# Patient Record
(demographics unavailable — no encounter records)

---

## 2024-10-25 NOTE — DISCUSSION/SUMMARY
[FreeTextEntry1] : ENT eval Discussed feeding issues and baby refusing to take bottles or pacifiers Discussed sleeping practices Recheck with 4 month WCC

## 2024-10-25 NOTE — PHYSICAL EXAM
[Discharge] : no discharge [Conjunctival Injection] : no conjunctival injection [Warm, Well Perfused x4] : warm, well perfused x4 [NL] : warm, clear [FreeTextEntry2] : AFOF [de-identified] : small tongue tie, able to thrust tongue forward

## 2024-10-25 NOTE — HISTORY OF PRESENT ILLNESS
[de-identified] : pt here to be checked for tounge tie  [FreeTextEntry6] : Saw lactation consultant, recommended seeing ENT for possible tongue tie issue Patient refuses to take pacifier or bottle, does not latch on to bottle Has no problems with breastfeeding, latch is good Patient gained 2 lbs in last month Lots of BMs and UO

## 2024-12-05 NOTE — HISTORY OF PRESENT ILLNESS
[Parents] : parents [Vitamins ___] : Patient takes [unfilled] vitamins daily [Normal] : Normal [Frequency of stools: ___] : Frequency of stools: [unfilled]  stools [In Bassinet/Crib] : sleeps in bassinet/crib [On back] : sleeps on back [Sleeps 12-16 hours per 24 hours (including naps)] : sleeps 12-16 hours per 24 hours (including naps) [No] : No cigarette smoke exposure [Water heater temperature set at <120 degrees F] : Water heater temperature set at <120 degrees F [Rear facing car seat in back seat] : Rear facing car seat in back seat [Carbon Monoxide Detectors] : Carbon monoxide detectors at home [Smoke Detectors] : Smoke detectors at home. [Co-sleeping] : no co-sleeping [Loose bedding, pillow, toys, and/or bumpers in crib] : no loose bedding, pillow, toys, and/or bumpers in crib [Pacifier use] : not using pacifier [Tummy time] : tummy time [Exposure to electronic nicotine delivery system] : No exposure to electronic nicotine delivery system [FreeTextEntry7] : 4m well visit [de-identified] : Breast feeding only, on demand, 20 minutes every 2 hours average  [FreeTextEntry1] : Got tongue tie clipped 1 month ago, doing well, now trying bottle w/ formula lactation consultant few times

## 2024-12-05 NOTE — PHYSICAL EXAM
[Alert] : alert [Normocephalic] : normocephalic [Flat Open Anterior [place]] : flat open anterior fontanelle [Red Reflex] : red reflex bilateral [PERRL] : PERRL [Normally Placed Ears] : normally placed ears [Auricles Well Formed] : auricles well formed [Clear Tympanic membranes] : clear tympanic membranes [Light reflex present] : light reflex present [Bony landmarks visible] : bony landmarks visible [Nares Patent] : nares patent [Palate Intact] : palate intact [Uvula Midline] : uvula midline [Symmetric Chest Rise] : symmetric chest rise [Clear to Auscultation Bilaterally] : clear to auscultation bilaterally [Regular Rate and Rhythm] : regular rate and rhythm [S1, S2 present] : S1, S2 present [+2 Femoral Pulses] : (+) 2 femoral pulses [Soft] : soft [Bowel Sounds] : bowel sounds present [Central Urethral Opening] : central urethral opening [Testicles Descended] : testicles descended bilaterally [Patent] : patent [Normally Placed] : normally placed [No Abnormal Lymph Nodes Palpated] : no abnormal lymph nodes palpated [Startle Reflex] : startle reflex present [Plantar Grasp] : plantar grasp reflex present [Symmetric Narda] : symmetric narda [Acute Distress] : no acute distress [Discharge] : no discharge [Palpable Masses] : no palpable masses [Murmurs] : no murmurs [Tender] : nontender [Distended] : nondistended [Hepatomegaly] : no hepatomegaly [Splenomegaly] : no splenomegaly [Briceño-Ortolani] : negative Briceño-Ortolani [Allis Sign] : negative Allis sign [Spinal Dimple] : no spinal dimple [Tuft of Hair] : no tuft of hair [Rash or Lesions] : no rash/lesions

## 2024-12-05 NOTE — PLAN
[TextEntry] : Recommend exclusive breastfeeding, 8-12 feedings per day. Mother should continue prenatal vitamins and avoid alcohol. If formula is needed, recommend iron-fortified formulations 6-8 feeds per day. When in car, patient should be in rear-facing car seat in back seat, do not leave baby in car for any reason or any time. Put baby to sleep on back, in own crib with no loose or soft bedding, no co-sleeping. Do not overheat baby. Protect baby by avoiding large crowds, extreme hot or cold environments, do not put baby in direct sun. Help baby to develop sleep and feeding routines. May offer pacifier if needed. Continue tummy time when awake and observed, to encourage play and muscle strength. Avoid screen/tv time at this age. Limit baby's exposure to others, especially those with fever or unknown vaccine status. Wash hands often, especially after changing diaper. Keep tobacco/vape free household and environment. Encourage family/caregivers to get protective vaccinations (flu, tdap). Parents counseled to call if rectal temperature >100.4 degrees F. Encourage parents to take CPR program/first aid program for infant. Start creating a daily routine for feeds and naps. Encourage both quiet and active playtime with baby for emerging social play development.   Iron fortified cereals mixed with formula or breast milk may be introduced using a spoon and bowl. Reinforced to only start solids/cereals if baby is able to hold head upright without support.  Monitor for tooth eruption, no bottles in bed, can start wiping erupting teeth with wash cloth or a soft toothbrush.   [ Vaccinations given at this visit can cause low grade temperature, irritability, and site reaction -- can use warm compress and Tylenol as needed ]   Return for 6 month well visit or sooner if needed or if concerns arise.

## 2024-12-21 NOTE — PHYSICAL EXAM
[Discharge] : no discharge [Conjunctival Injection] : no conjunctival injection [Erythema] : no erythema [Bulging] : not bulging [Wheezing] : no wheezing [Rales] : no rales [Crackles] : no crackles [Transmitted Upper Airway Sounds] : no transmitted upper airway sounds [Tachypnea] : no tachypnea [Rhonchi] : no rhonchi [Belly Breathing] : no belly breathing [Subcostal Retractions] : no subcostal retractions [Suprasternal Retractions] : no suprasternal retractions [Warm, Well Perfused x4] : warm, well perfused x4 [NL] : warm, clear [FreeTextEntry2] : AFOF [de-identified] : pink, no cyanosis

## 2024-12-21 NOTE — DISCUSSION/SUMMARY
[FreeTextEntry1] : Discussed sleep training and habits Maintain adequate hydration  Stressed handwashing and infection control  Pay close observation for new or worsening symptoms Recheck as needed

## 2024-12-21 NOTE — REVIEW OF SYSTEMS
[Fussy] : not fussy [Fever] : no fever [Ear Tugging] : no ear tugging [Nasal Discharge] : no nasal discharge [Nasal Congestion] : no nasal congestion [Cyanosis] : no cyanosis [Tachypnea] : not tachypneic [Wheezing] : no wheezing [Cough] : cough [Vomiting] : no vomiting [Diarrhea] : no diarrhea [Negative] : Genitourinary

## 2024-12-21 NOTE — HISTORY OF PRESENT ILLNESS
[de-identified] : recheck lungs [FreeTextEntry6] : Cough much better, using saline nebulizer treatments with improvement Appetite good, no vomiting or diarrhea No more fever Mom has questions about sleep training

## 2025-02-13 NOTE — PLAN
[TextEntry] : Recommend exclusive breastfeeding, 8-12 feedings per day, continue Vitamin D supplement. Mother should continue prenatal vitamins and avoid alcohol. If formula is needed, recommend iron-fortified formulations 5-6 feeds per day. When in car, patient should be in rear-facing car seat in back seat, do not leave baby in car for any reason or any time. Put baby to sleep on back, in own crib with no loose or soft bedding, no co-sleeping. Can use sunscreen if outside, do not put baby in direct sunlight. Help baby to develop consistent sleep and feeding routines. May offer pacifier if needed. Continue tummy time when awake and observed, to encourage play and muscle strength. Encourage sitting up. Avoid screen/tv time at this age. Wash hands often, especially after changing diaper. Keep tobacco/vape free household and environment. Encourage family/caregivers to get protective vaccinations. Talk with your baby, play music and sing, can play peekaboo with baby. Encourage face to face interactions and communication. Encourage self-calming behaviors. Maintain safe home due to increasingly mobile baby. Do not leave baby alone.   Monitor for tooth eruption, no propping bottles, and no bottles in bed, can start wiping erupting teeth with wash cloth or a soft toothbrush. Introduce single-ingredient foods rich in iron varying in texture, one at a time, several apart to watch for any allergic reaction. Do not give baby honey. Avoid sweetened drinks/juices. Avoid choking by limiting finger foods to smaller than a cheerio. No hard/compressible foods. Start daily multivitamin with fluoride.   [ Vaccinations given at this visit can cause low grade temperature, irritability, and site reaction -- can use warm compress and Tylenol / Ibuprofen as needed ]   Return for 9 month well visit or sooner if needed or if concerns arise.

## 2025-02-13 NOTE — DISCUSSION/SUMMARY
[Normal Growth] : growth [Normal Development] : development [None] : No medical problems [No Elimination Concerns] : elimination [No Feeding Concerns] : feeding [No Skin Concerns] : skin [Normal Sleep Pattern] : sleep [Family Functioning] : family functioning [Nutrition and Feeding] : nutrition and feeding [Infant Development] : infant development [Oral Health] : oral health [Safety] : safety [No Medications] : ~He/She~ is not on any medications [Parent/Guardian] : parent/guardian [] : The components of the vaccine(s) to be administered today are listed in the plan of care. The disease(s) for which the vaccine(s) are intended to prevent and the risks have been discussed with the caretaker.  The risks are also included in the appropriate vaccination information statements which have been provided to the patient's caregiver.  The caregiver has given consent to vaccinate. [FreeTextEntry1] : Defer flu/covid

## 2025-02-13 NOTE — HISTORY OF PRESENT ILLNESS
[Mother] : mother [Egg] : no egg [Normal] : Normal [In Bassinet/Crib] : sleeps in bassinet/crib [On back] : sleeps on back [Co-sleeping] : no co-sleeping [Sleeps 12-16 hours per 24 hours (including naps)] : sleeps 12-16 hours per 24 hours (including naps) [Loose bedding, pillow, toys, and/or bumpers in crib] : no loose bedding, pillow, toys, and/or bumpers in crib [Tummy time] : tummy time [No] : No cigarette smoke exposure [Exposure to electronic nicotine delivery system] : No exposure to electronic nicotine delivery system [Water heater temperature set at <120 degrees F] : Water heater temperature set at <120 degrees F [Rear facing car seat in back seat] : Rear facing car seat in back seat [Carbon Monoxide Detectors] : Carbon monoxide detectors at home [Smoke Detectors] : Smoke detectors at home. [de-identified] : 6 month well visit [de-identified] : Formula (Sim isopure) 7oz every 3 hours during day, taking purees (veggies/fruits/cereals), has had yogurt too  [FreeTextEntry1] : Allergist doing BW next week  Concerned about peanuts d/t 2 hours after giving it that he has slight rash on belly -- has f/u with allergist, avoiding peanuts   NO other specialists

## 2025-02-13 NOTE — PHYSICAL EXAM
[Alert] : alert [Consolable] : consolable [Normocephalic] : normocephalic [Flat Open Anterior [place]] : flat open anterior fontanelle [Red Reflex] : red reflex bilateral [PERRL] : PERRL [Normally Placed Ears] : normally placed ears [Auricles Well Formed] : auricles well formed [Clear Tympanic membranes] : clear tympanic membranes [Light reflex present] : light reflex present [Bony landmarks visible] : bony landmarks visible [Nares Patent] : nares patent [Palate Intact] : palate intact [Uvula Midline] : uvula midline [Supple, full passive range of motion] : supple, full passive range of motion [Symmetric Chest Rise] : symmetric chest rise [Clear to Auscultation Bilaterally] : clear to auscultation bilaterally [Regular Rate and Rhythm] : regular rate and rhythm [S1, S2 present] : S1, S2 present [+2 Femoral Pulses] : (+) 2 femoral pulses [Soft] : soft [Bowel Sounds] : bowel sounds present [Central Urethral Opening] : central urethral opening [Testicles Descended] : testicles descended bilaterally [Patent] : patent [Normally Placed] : normally placed [No Abnormal Lymph Nodes Palpated] : no abnormal lymph nodes palpated [Symmetric Buttocks Creases] : symmetric buttocks creases [Plantar Grasp] : plantar grasp reflex present [Cranial Nerves Grossly Intact] : cranial nerves grossly intact [Acute Distress] : no acute distress [Discharge] : no discharge [Tooth Eruption] : no tooth eruption [Palpable Masses] : no palpable masses [Murmurs] : no murmurs [Tender] : nontender [Distended] : nondistended [Hepatomegaly] : no hepatomegaly [Splenomegaly] : no splenomegaly [Briceño-Ortolani] : negative Briceño-Ortolani [Allis Sign] : negative Allis sign [Spinal Dimple] : no spinal dimple [Tuft of Hair] : no tuft of hair [Rash or Lesions] : no rash/lesions

## 2025-02-13 NOTE — DEVELOPMENTAL MILESTONES
[Normal Development] : Normal Development [None] : none [Pats or smiles at reflection] : pats or smiles at reflection [Begins to turn when name called] : begins to turn when name called [Babbles] : babbles [Rolls over prone to supine] : rolls over prone to supine [Sits briefly without support] : sits briefly without support [Rakes small object with 4 fingers] : rakes small object with 4 fingers [[place] small object on surface] : bangs small object on surface [Passed] : passed [FreeTextEntry1] : [place] reviewed

## 2025-06-23 NOTE — PHYSICAL EXAM
[Alert] : alert [Acute Distress] : no acute distress [Normocephalic] : normocephalic [Flat Open Anterior [place]] : flat open anterior fontanelle [Red Reflex] : red reflex bilateral [Excessive Tearing] : no excessive tearing [PERRL] : PERRL [Normally Placed Ears] : normally placed ears [Auricles Well Formed] : auricles well formed [Clear Tympanic membranes] : clear tympanic membranes [Light reflex present] : light reflex present [Bony landmarks visible] : bony landmarks visible [Discharge] : no discharge [Nares Patent] : nares patent [Palate Intact] : palate intact [Uvula Midline] : uvula midline [Supple, full passive range of motion] : supple, full passive range of motion [Palpable Masses] : no palpable masses [Symmetric Chest Rise] : symmetric chest rise [Clear to Auscultation Bilaterally] : clear to auscultation bilaterally [Regular Rate and Rhythm] : regular rate and rhythm [S1, S2 present] : S1, S2 present [Murmurs] : no murmurs [+2 Femoral Pulses] : (+) 2 femoral pulses [Soft] : soft [Tender] : nontender [Distended] : nondistended [Bowel Sounds] : bowel sounds present [Hepatomegaly] : no hepatomegaly [Splenomegaly] : no splenomegaly [Normal External Genitalia] : normal external genitalia [Circumcised] : circumcised [Central Urethral Opening] : central urethral opening [Testicles Descended] : testicles descended bilaterally [No Abnormal Lymph Nodes Palpated] : no abnormal lymph nodes palpated [Symmetric Abduction and Rotation of hips] : symmetric abduction and rotation of hips [Allis Sign] : negative Allis sign [Straight] : straight [Cranial Nerves Grossly Intact] : cranial nerves grossly intact [Rash or Lesions] : no rash/lesions [de-identified] : penile adhesions

## 2025-06-23 NOTE — DISCUSSION/SUMMARY
[Normal Growth] : growth [Normal Development] : development [None] : No known medical problems [No Elimination Concerns] : elimination [No Feeding Concerns] : feeding [No Skin Concerns] : skin [Normal Sleep Pattern] : sleep [Family Adaptation] : family adaptation [Infant [place]] : infant independence [Feeding Routine] : feeding routine [Safety] : safety [No Medications] : ~He/She~ is not on any medications [Parent/Guardian] : parent/guardian [FreeTextEntry1] : FAMILY ADAPTATIONS: Discussed discipline (parenting expectations, consistency, behavior management), cultural beliefs about child-rearing, family functioning, domestic violence.  INFANT DEVELOPMENT: Discussed changing sleep pattern (sleep schedule), developmental mobility (safe exploration, play), cognitive development (object permanence, separation anxiety, behavior and learning, temperament vs. self-regulation, visual exploration, cause and effect), communication.  NUTRITIONAL ADEQUACY AND GROWTH: Discussed self-feeding, mealtime routines, transition to solids (table food introduction), cup drinking, plans for weaning.  SAFETY: car safety seats, burns (hot stoves, heaters), window guards, drowning, poisoning, (safety locks), guns. Smoke and carbon monoxide monitors stressed.  Lead exposure discussed. Penile adhesions lysed with mechanical retraction. Patient tolerated procedure well. Recommend daily retraction q diaper change Bacitracin ointment q diaper change until healed Stressed handwashing and infection control  Pay close observation for new or worsening symptoms Recheck 1 year [facility]

## 2025-06-23 NOTE — HISTORY OF PRESENT ILLNESS
[Parents] : parents [Well-balanced] : well-balanced [Fruit] : fruit [Vegetables] : vegetables [Cereal] : cereal [Baby food] : baby food [Normal] : Normal [In Crib] : sleeps in crib [On back] : sleeps on back [Co-sleeping] : no co-sleeping [Wakes up at night] : wakes up at night [Sleeps 12-16 hours per 24 hours (including naps)] : Does not sleep 12-16 hours per 24 hours (including naps) [Pacifier use] : Pacifier use [Vitamin] : Primary Fluoride Source: Vitamin [No] : No cigarette smoke exposure [Water heater temperature set at <120 degrees F] : Water heater temperature set at <120 degrees F [Rear facing car seat in  back seat] : Rear facing car seat in  back seat [Carbon Monoxide Detectors] : Carbon monoxide detectors [Smoke Detectors] : Smoke detectors [Up to date] : Up to date [FreeTextEntry7] : 9 month well [NO] : No [FreeTextEntry1] : Had allergy testing, NEGATIVE peanut and egg allergy per mom

## 2025-06-23 NOTE — DEVELOPMENTAL MILESTONES
[Normal Development] : Normal Development [None] : none [Uses basic gestures] : uses basic gestures [Says "Og" or "Mama"] : says "Og" or "Mama" nonspecifically [Sits well without support] : sits well without support [Transitions between sitting and lying] : transitions between sitting and lying [Balances on hands and knees] : balances on hands and knees [Crawls] : crawls [Picks up small objects with 3 fingers] : picks up small objects with 3 fingers and thumb [Releases objects intentionally] : releases objects intentionally [[place] objects together] : bangs objects together [Yes] : Completed.

## 2025-07-30 NOTE — PHYSICAL EXAM
[Alert] : alert [Normocephalic] : normocephalic [Closed Anterior [place]] : closed anterior fontanelle [Red Reflex] : red reflex bilateral [PERRL] : PERRL [Normally Placed Ears] : normally placed ears [Auricles Well Formed] : auricles well formed [Clear Tympanic membranes] : clear tympanic membranes [Light reflex present] : light reflex present [Bony landmarks visible] : bony landmarks visible [Nares Patent] : nares patent [Palate Intact] : palate intact [Uvula Midline] : uvula midline [Tooth Eruption] : tooth eruption [Supple, full passive range of motion] : supple, full passive range of motion [Symmetric Chest Rise] : symmetric chest rise [Clear to Auscultation Bilaterally] : clear to auscultation bilaterally [Regular Rate and Rhythm] : regular rate and rhythm [S1, S2 present] : S1, S2 present [+2 Femoral Pulses] : (+) 2 femoral pulses [Soft] : soft [Bowel Sounds] : normoactive bowel sounds [Central Urethral Opening] : central urethral opening [Testicles Descended] : testicles descended bilaterally [No Abnormal Lymph Nodes Palpated] : no abnormal lymph nodes palpated [Symmetric Abduction and Rotation of Hips] : symmetric abduction and rotation of hips [Straight] : straight [Cranial Nerves Grossly Intact] : cranial nerves grossly intact [Discharge] : no discharge [Palpable Masses] : no palpable masses [Murmurs] : no murmurs [Tender] : nontender [Distended] : nondistended [Hepatomegaly] : no hepatomegaly [Splenomegaly] : no splenomegaly [Normal External Genitalia] : normal external genitalia [Circumcised] : circumcised [Allis Sign] : negative Allis sign [Rash or Lesions] : no rash/lesions [de-identified] : some readhesions

## 2025-07-30 NOTE — HISTORY OF PRESENT ILLNESS
[Mother] : mother [Normal] : Normal [Pacifier use] : Pacifier use [Playtime] : Playtime  [No] : No cigarette smoke exposure [Water heater temperature set at <120 degrees F] : Water heater temperature set at <120 degrees F [Car seat in back seat] : Car seat in back seat [Smoke Detectors] : Smoke detectors [Carbon Monoxide Detectors] : Carbon monoxide detectors [Up to date] : Up to date [Cow's milk ___ oz/feed] : [unfilled] oz of Cow's milk per feed [Fruit] : fruit [Baby food] : baby food [At risk for exposure to TB] : Not at risk for exposure to Tuberculosis [FreeTextEntry7] : 12 month well visit  [LastFluoridetreatment] : NA

## 2025-07-30 NOTE — DISCUSSION/SUMMARY
[Normal Growth] : growth [Normal Development] : development [None] : No known medical problems [No Elimination Concerns] : elimination [No Feeding Concerns] : feeding [No Skin Concerns] : skin [Normal Sleep Pattern] : sleep [Family Support] : family support [Establishing Routines] : establishing routines [Feeding and Appetite Changes] : feeding and appetite changes [Establishing A Dental Home] : establishing a dental home [Safety] : safety [No Medications] : ~He/She~ is not on any medications [Parent/Guardian] : parent/guardian [FreeTextEntry1] : FAMILY SUPPORT: Discussed adjustments to the child's developmental changes and behavior, family-work balance, parental agreement/disagreement about child issues.  ESTABLISHING ROUTINES: Discussed family time, bedtime, tooth brushing, nap times.  FEEDING AND APPETITE CHANGES: Discussed self-feeding, nutritious foods, choices, "grazing".  DENTAL HEALTH: Discussed establishing a dental home. First dental checkup, dental hygiene.   SAFETY:  Discussed home safety, car safety seats, drowning, guns. Smoke and carbon monoxide monitors stressed.  Lead exposure discussed. penile readhesions discussed, mom to pull back foreskin more Recheck 2-3 weeks if still present

## 2025-07-30 NOTE — DEVELOPMENTAL MILESTONES
[Normal Development] : Normal Development [None] : none [FreeTextEntry1] : GM - 14.3 FMA - 16.1 PS - 17.1 L -13.1